# Patient Record
Sex: MALE | Race: WHITE | NOT HISPANIC OR LATINO | Employment: FULL TIME | URBAN - METROPOLITAN AREA
[De-identification: names, ages, dates, MRNs, and addresses within clinical notes are randomized per-mention and may not be internally consistent; named-entity substitution may affect disease eponyms.]

---

## 2023-04-17 ENCOUNTER — OFFICE VISIT (OUTPATIENT)
Dept: MEDICAL GROUP | Facility: MEDICAL CENTER | Age: 38
End: 2023-04-17
Payer: COMMERCIAL

## 2023-04-17 VITALS
TEMPERATURE: 97.4 F | DIASTOLIC BLOOD PRESSURE: 82 MMHG | HEIGHT: 71 IN | OXYGEN SATURATION: 98 % | BODY MASS INDEX: 24.58 KG/M2 | WEIGHT: 175.6 LBS | SYSTOLIC BLOOD PRESSURE: 124 MMHG | HEART RATE: 63 BPM

## 2023-04-17 DIAGNOSIS — R07.89 OTHER CHEST PAIN: ICD-10-CM

## 2023-04-17 DIAGNOSIS — K13.70 ORAL MUCOSAL LESION: ICD-10-CM

## 2023-04-17 DIAGNOSIS — Z11.3 ROUTINE SCREENING FOR STI (SEXUALLY TRANSMITTED INFECTION): ICD-10-CM

## 2023-04-17 DIAGNOSIS — Z11.59 ENCOUNTER FOR HEPATITIS C SCREENING TEST FOR LOW RISK PATIENT: ICD-10-CM

## 2023-04-17 DIAGNOSIS — R00.2 PALPITATION: ICD-10-CM

## 2023-04-17 PROCEDURE — 99204 OFFICE O/P NEW MOD 45 MIN: CPT | Performed by: STUDENT IN AN ORGANIZED HEALTH CARE EDUCATION/TRAINING PROGRAM

## 2023-04-17 PROCEDURE — 93000 ELECTROCARDIOGRAM COMPLETE: CPT | Performed by: STUDENT IN AN ORGANIZED HEALTH CARE EDUCATION/TRAINING PROGRAM

## 2023-04-17 RX ORDER — ASPIRIN 81 MG/1
81 TABLET, CHEWABLE ORAL DAILY
COMMUNITY

## 2023-04-17 RX ORDER — VALACYCLOVIR HYDROCHLORIDE 500 MG/1
TABLET, FILM COATED ORAL
Qty: 30 TABLET | Refills: 0 | Status: SHIPPED | OUTPATIENT
Start: 2023-04-17 | End: 2023-07-26

## 2023-04-17 ASSESSMENT — PATIENT HEALTH QUESTIONNAIRE - PHQ9: CLINICAL INTERPRETATION OF PHQ2 SCORE: 0

## 2023-04-17 ASSESSMENT — ENCOUNTER SYMPTOMS
CHILLS: 0
PALPITATIONS: 0
FEVER: 0
ORTHOPNEA: 0
CLAUDICATION: 0
BLOOD IN STOOL: 0
WEIGHT LOSS: 0
CONSTIPATION: 0
WHEEZING: 0
DIARRHEA: 0
SHORTNESS OF BREATH: 0

## 2023-04-17 NOTE — PROGRESS NOTES
"Subjective:     CC:  Diagnoses of Oral mucosal lesion, Other chest pain, Palpitation, Routine screening for STI (sexually transmitted infection), Encounter for hepatitis C screening test for low risk patient, and Need for vaccination were pertinent to this visit.    HISTORY OF THE PRESENT ILLNESS: Patient is a 38 y.o. male. This pleasant patient is here today to establish care and discuss     Pleasant 38-year-old male with no significant medical history presents for complaint of recent oral mucosal lesions times in the past 6 months.  And intermittent chest pain/palpitations since COVID infection in 2020.  He is otherwise doing well.    Problem   Oral Mucosal Lesion    Presumed     Other Chest Pain   Palpitation    Intermittent palpitation since COVID infection 1/2020, 11/2021  Reports since covid infection he had intermittent symptoms of palpitation  - 3-4 episodes since covid infection          Health Maintenance:     ROS:   Review of Systems   Constitutional:  Negative for chills, fever and weight loss.   Respiratory:  Negative for shortness of breath and wheezing.    Cardiovascular:  Negative for chest pain, palpitations, orthopnea, claudication and leg swelling.   Gastrointestinal:  Negative for blood in stool, constipation, diarrhea and melena.   Skin:  Negative for itching and rash.       Objective:       Exam: /82 (BP Location: Right arm, Patient Position: Sitting, BP Cuff Size: Adult)   Pulse 63   Temp 36.3 °C (97.4 °F) (Temporal)   Ht 1.803 m (5' 11\")   Wt 79.7 kg (175 lb 9.6 oz)   SpO2 98%  Body mass index is 24.49 kg/m².    Physical Exam  Constitutional:       Appearance: Normal appearance.   Cardiovascular:      Rate and Rhythm: Normal rate and regular rhythm.      Heart sounds: No murmur heard.  Pulmonary:      Effort: Pulmonary effort is normal.      Breath sounds: Normal breath sounds. No wheezing.   Musculoskeletal:      Cervical back: Normal range of motion and neck supple. "   Lymphadenopathy:      Cervical: No cervical adenopathy.   Neurological:      Mental Status: He is alert.         Labs: none available to review    Assessment & Plan:   38 y.o. male with the following -      1. Oral mucosal lesion  Chronic, intermittent  Given history suspect possible HSV treat empirically.  Valacyclovir as needed 500 mg twice daily x3 days at symptom onset.  Plan  If symptoms persist despite treatment may consider PCR blood    2. Other chest pain  Chronic, rare intermittent, stable  Patient reports since COVID infection in 2020 he has been experiencing rare symptoms of chest pain sometimes when he is laying down.  He denies chest pain associated with exercise, dyspnea on exertion, claudication  Plan  Patient would like to restratify with CT coronary calcium  We will obtain lipids for stratification  - CT-HEART W/O CONT EVAL CALCIUM; Future  - CBC WITHOUT DIFFERENTIAL; Future  - Comp Metabolic Panel; Future  - Lipid Profile; Future  - TSH WITH REFLEX TO FT4; Future    3. Palpitation  Chronic, intermittent  Patient reports rare episodes of palpitations since history of COVID infections in 2020.  These episodes usually last seconds to minutes  He has no other significant cardiac symptoms  Plan  EKG performed today shows sinus bradycardia at rate of 53, DC interval 176, QRS narrow, sis equivocal.  No PVCs, PACs, QT prolongation, evidence of arrhythmia.  No preexcitation noted.  No cardiac murmur on exam.  No family history of sudden cardiac death  Continue to monitor may consider echocardiogram if symptoms worsen or progress  - EKG - Clinic Performed  - TSH WITH REFLEX TO FT4; Future    4. Routine screening for STI (sexually transmitted infection)    - Chlamydia/GC, PCR (Urine); Future  - T.PALLIDUM AB ALFRED (SCREENING); Future  - HIV AG/AB COMBO ASSAY SCREENING; Future    5. Encounter for hepatitis C screening test for low risk patient    - HEP C VIRUS ANTIBODY; Future            Return in about 3  months (around 7/17/2023) for Lab review.    Please note that this dictation was created using voice recognition software. I have made every reasonable attempt to correct obvious errors, but I expect that there are errors of grammar and possibly content that I did not discover before finalizing the note.

## 2023-04-18 ENCOUNTER — HOSPITAL ENCOUNTER (OUTPATIENT)
Dept: LAB | Facility: MEDICAL CENTER | Age: 38
End: 2023-04-18
Attending: STUDENT IN AN ORGANIZED HEALTH CARE EDUCATION/TRAINING PROGRAM
Payer: COMMERCIAL

## 2023-04-18 ENCOUNTER — HOSPITAL ENCOUNTER (OUTPATIENT)
Dept: RADIOLOGY | Facility: MEDICAL CENTER | Age: 38
End: 2023-04-18
Attending: STUDENT IN AN ORGANIZED HEALTH CARE EDUCATION/TRAINING PROGRAM
Payer: COMMERCIAL

## 2023-04-18 DIAGNOSIS — R00.2 PALPITATION: ICD-10-CM

## 2023-04-18 DIAGNOSIS — Z11.59 ENCOUNTER FOR HEPATITIS C SCREENING TEST FOR LOW RISK PATIENT: ICD-10-CM

## 2023-04-18 DIAGNOSIS — R07.89 OTHER CHEST PAIN: ICD-10-CM

## 2023-04-18 DIAGNOSIS — Z11.3 ROUTINE SCREENING FOR STI (SEXUALLY TRANSMITTED INFECTION): ICD-10-CM

## 2023-04-18 LAB
ALBUMIN SERPL BCP-MCNC: 4.6 G/DL (ref 3.2–4.9)
ALBUMIN/GLOB SERPL: 1.9 G/DL
ALP SERPL-CCNC: 59 U/L (ref 30–99)
ALT SERPL-CCNC: 18 U/L (ref 2–50)
ANION GAP SERPL CALC-SCNC: 11 MMOL/L (ref 7–16)
AST SERPL-CCNC: 14 U/L (ref 12–45)
BILIRUB SERPL-MCNC: 0.3 MG/DL (ref 0.1–1.5)
BUN SERPL-MCNC: 14 MG/DL (ref 8–22)
CALCIUM ALBUM COR SERPL-MCNC: 8.6 MG/DL (ref 8.5–10.5)
CALCIUM SERPL-MCNC: 9.1 MG/DL (ref 8.5–10.5)
CHLORIDE SERPL-SCNC: 104 MMOL/L (ref 96–112)
CHOLEST SERPL-MCNC: 201 MG/DL (ref 100–199)
CO2 SERPL-SCNC: 24 MMOL/L (ref 20–33)
CREAT SERPL-MCNC: 0.87 MG/DL (ref 0.5–1.4)
ERYTHROCYTE [DISTWIDTH] IN BLOOD BY AUTOMATED COUNT: 43.2 FL (ref 35.9–50)
FASTING STATUS PATIENT QL REPORTED: NORMAL
GFR SERPLBLD CREATININE-BSD FMLA CKD-EPI: 112 ML/MIN/1.73 M 2
GLOBULIN SER CALC-MCNC: 2.4 G/DL (ref 1.9–3.5)
GLUCOSE SERPL-MCNC: 79 MG/DL (ref 65–99)
HCT VFR BLD AUTO: 44.8 % (ref 42–52)
HDLC SERPL-MCNC: 40 MG/DL
HGB BLD-MCNC: 15.6 G/DL (ref 14–18)
LDLC SERPL CALC-MCNC: 141 MG/DL
MCH RBC QN AUTO: 30.8 PG (ref 27–33)
MCHC RBC AUTO-ENTMCNC: 34.8 G/DL (ref 33.7–35.3)
MCV RBC AUTO: 88.4 FL (ref 81.4–97.8)
PLATELET # BLD AUTO: 302 K/UL (ref 164–446)
PMV BLD AUTO: 10.6 FL (ref 9–12.9)
POTASSIUM SERPL-SCNC: 4 MMOL/L (ref 3.6–5.5)
PROT SERPL-MCNC: 7 G/DL (ref 6–8.2)
RBC # BLD AUTO: 5.07 M/UL (ref 4.7–6.1)
SODIUM SERPL-SCNC: 139 MMOL/L (ref 135–145)
TRIGL SERPL-MCNC: 100 MG/DL (ref 0–149)
WBC # BLD AUTO: 5.9 K/UL (ref 4.8–10.8)

## 2023-04-18 PROCEDURE — 85027 COMPLETE CBC AUTOMATED: CPT

## 2023-04-18 PROCEDURE — 87491 CHLMYD TRACH DNA AMP PROBE: CPT

## 2023-04-18 PROCEDURE — 80053 COMPREHEN METABOLIC PANEL: CPT

## 2023-04-18 PROCEDURE — 87389 HIV-1 AG W/HIV-1&-2 AB AG IA: CPT

## 2023-04-18 PROCEDURE — 86803 HEPATITIS C AB TEST: CPT

## 2023-04-18 PROCEDURE — 36415 COLL VENOUS BLD VENIPUNCTURE: CPT

## 2023-04-18 PROCEDURE — 80061 LIPID PANEL: CPT

## 2023-04-18 PROCEDURE — 86780 TREPONEMA PALLIDUM: CPT

## 2023-04-18 PROCEDURE — 4410556 CT-CARDIAC SCORING (SELF PAY ONLY)

## 2023-04-18 PROCEDURE — 87591 N.GONORRHOEAE DNA AMP PROB: CPT

## 2023-04-18 PROCEDURE — 84443 ASSAY THYROID STIM HORMONE: CPT

## 2023-04-19 LAB
C TRACH DNA SPEC QL NAA+PROBE: NEGATIVE
HCV AB SER QL: NORMAL
HIV 1+2 AB+HIV1 P24 AG SERPL QL IA: NORMAL
N GONORRHOEA DNA SPEC QL NAA+PROBE: NEGATIVE
SPECIMEN SOURCE: NORMAL
T PALLIDUM AB SER QL IA: NORMAL
TSH SERPL DL<=0.005 MIU/L-ACNC: 0.79 UIU/ML (ref 0.38–5.33)

## 2023-07-26 ENCOUNTER — OFFICE VISIT (OUTPATIENT)
Dept: MEDICAL GROUP | Facility: MEDICAL CENTER | Age: 38
End: 2023-07-26
Payer: COMMERCIAL

## 2023-07-26 VITALS
WEIGHT: 172.8 LBS | BODY MASS INDEX: 24.19 KG/M2 | TEMPERATURE: 97.5 F | HEART RATE: 66 BPM | OXYGEN SATURATION: 98 % | SYSTOLIC BLOOD PRESSURE: 126 MMHG | HEIGHT: 71 IN | DIASTOLIC BLOOD PRESSURE: 84 MMHG

## 2023-07-26 DIAGNOSIS — E78.5 DYSLIPIDEMIA: ICD-10-CM

## 2023-07-26 DIAGNOSIS — K13.70 ORAL MUCOSAL LESION: ICD-10-CM

## 2023-07-26 PROCEDURE — 3074F SYST BP LT 130 MM HG: CPT | Performed by: STUDENT IN AN ORGANIZED HEALTH CARE EDUCATION/TRAINING PROGRAM

## 2023-07-26 PROCEDURE — 99213 OFFICE O/P EST LOW 20 MIN: CPT | Performed by: STUDENT IN AN ORGANIZED HEALTH CARE EDUCATION/TRAINING PROGRAM

## 2023-07-26 PROCEDURE — 3079F DIAST BP 80-89 MM HG: CPT | Performed by: STUDENT IN AN ORGANIZED HEALTH CARE EDUCATION/TRAINING PROGRAM

## 2023-07-26 ASSESSMENT — FIBROSIS 4 INDEX: FIB4 SCORE: 0.43

## 2023-07-26 NOTE — PROGRESS NOTES
"Subjective:     CC: Lab follow-up, oral lesions    HPI:   Niles presents today with    Oral mucosal lesion  Seen 3 months ago at that point report oral lesions and furred by dentist to be possible herpes.   On examination there is a single shallow small ulcers on the upper lip with slight erythema and outer edge yellow base.  More consistent with aphthous ulcers.  Labs CBC were fine, CMP were fine, STD screening were negative.    LDL elevated 141, CT coronary calcium 0    Problem   Dyslipidemia    Mild LDL elevation at 140, on 4/2023. CT coronary calcium was 0 (2023)     Oral Mucosal Lesion    On examination and history more consistent with aphthous ulcer         Health Maintenance:     ROS:  ROS At baseline    Objective:     Exam:  /84 (BP Location: Right arm, Patient Position: Sitting, BP Cuff Size: Adult)   Pulse 66   Temp 36.4 °C (97.5 °F) (Temporal)   Ht 1.803 m (5' 11\")   Wt 78.4 kg (172 lb 12.8 oz)   SpO2 98%   BMI 24.10 kg/m²  Body mass index is 24.1 kg/m².    Physical Exam  Constitutional:       Appearance: Normal appearance.   HENT:      Head:      Comments: See hpi  Cardiovascular:      Rate and Rhythm: Normal rate and regular rhythm.   Pulmonary:      Effort: Pulmonary effort is normal.      Breath sounds: Normal breath sounds.   Neurological:      Mental Status: He is alert.           Labs:     Assessment & Plan:     38 y.o. male with the following -     1. Dyslipidemia  Chronic stable  Ascvd low risk  Ct coronoary 0  Ldl 140s  Rec stop primary prevention with asa 81    2. Oral mucosal lesion  acute, intermittent stable  Likely aphthous ulcer, recc otc oral gel/ steroids/ conservative management             Return in about 1 year (around 7/26/2024) for Annual wellness visit.    Please note that this dictation was created using voice recognition software. I have made every reasonable attempt to correct obvious errors, but I expect that there are errors of grammar and possibly content that I " did not discover before finalizing the note.

## 2024-07-31 ENCOUNTER — APPOINTMENT (OUTPATIENT)
Dept: MEDICAL GROUP | Facility: MEDICAL CENTER | Age: 39
End: 2024-07-31
Payer: COMMERCIAL